# Patient Record
Sex: MALE | Race: BLACK OR AFRICAN AMERICAN | URBAN - METROPOLITAN AREA
[De-identification: names, ages, dates, MRNs, and addresses within clinical notes are randomized per-mention and may not be internally consistent; named-entity substitution may affect disease eponyms.]

---

## 2018-03-30 ENCOUNTER — OFFICE VISIT (OUTPATIENT)
Dept: URGENT CARE | Facility: URGENT CARE | Age: 53
End: 2018-03-30
Payer: COMMERCIAL

## 2018-03-30 VITALS
RESPIRATION RATE: 16 BRPM | SYSTOLIC BLOOD PRESSURE: 178 MMHG | WEIGHT: 196 LBS | HEART RATE: 79 BPM | OXYGEN SATURATION: 99 % | DIASTOLIC BLOOD PRESSURE: 100 MMHG | TEMPERATURE: 97.8 F

## 2018-03-30 DIAGNOSIS — R07.0 THROAT PAIN: Primary | ICD-10-CM

## 2018-03-30 LAB
BASOPHILS # BLD AUTO: 0.1 10E9/L (ref 0–0.2)
BASOPHILS NFR BLD AUTO: 0.7 %
DEPRECATED S PYO AG THROAT QL EIA: NORMAL
DIFFERENTIAL METHOD BLD: NORMAL
EOSINOPHIL # BLD AUTO: 0 10E9/L (ref 0–0.7)
EOSINOPHIL NFR BLD AUTO: 0.4 %
ERYTHROCYTE [DISTWIDTH] IN BLOOD BY AUTOMATED COUNT: 13.3 % (ref 10–15)
HCT VFR BLD AUTO: 50.7 % (ref 40–53)
HGB BLD-MCNC: 16.9 G/DL (ref 13.3–17.7)
LYMPHOCYTES # BLD AUTO: 2.9 10E9/L (ref 0.8–5.3)
LYMPHOCYTES NFR BLD AUTO: 42.8 %
MCH RBC QN AUTO: 30.6 PG (ref 26.5–33)
MCHC RBC AUTO-ENTMCNC: 33.3 G/DL (ref 31.5–36.5)
MCV RBC AUTO: 92 FL (ref 78–100)
MONOCYTES # BLD AUTO: 0.6 10E9/L (ref 0–1.3)
MONOCYTES NFR BLD AUTO: 8.4 %
NEUTROPHILS # BLD AUTO: 3.2 10E9/L (ref 1.6–8.3)
NEUTROPHILS NFR BLD AUTO: 47.7 %
PLATELET # BLD AUTO: 227 10E9/L (ref 150–450)
RBC # BLD AUTO: 5.52 10E12/L (ref 4.4–5.9)
SPECIMEN SOURCE: NORMAL
TSH SERPL DL<=0.005 MIU/L-ACNC: 3.74 MU/L (ref 0.4–4)
WBC # BLD AUTO: 6.7 10E9/L (ref 4–11)

## 2018-03-30 PROCEDURE — 85025 COMPLETE CBC W/AUTO DIFF WBC: CPT | Performed by: FAMILY MEDICINE

## 2018-03-30 PROCEDURE — 87880 STREP A ASSAY W/OPTIC: CPT | Performed by: FAMILY MEDICINE

## 2018-03-30 PROCEDURE — 99203 OFFICE O/P NEW LOW 30 MIN: CPT | Performed by: FAMILY MEDICINE

## 2018-03-30 PROCEDURE — 36415 COLL VENOUS BLD VENIPUNCTURE: CPT | Performed by: FAMILY MEDICINE

## 2018-03-30 PROCEDURE — 87081 CULTURE SCREEN ONLY: CPT | Performed by: FAMILY MEDICINE

## 2018-03-30 PROCEDURE — 84443 ASSAY THYROID STIM HORMONE: CPT | Performed by: FAMILY MEDICINE

## 2018-03-30 RX ORDER — IBUPROFEN 600 MG/1
600 TABLET, FILM COATED ORAL EVERY 8 HOURS PRN
Qty: 60 TABLET | Refills: 0 | Status: SHIPPED | OUTPATIENT
Start: 2018-03-30

## 2018-03-30 NOTE — LETTER
[facility]  [address] [address]  Phone: [phone]    March 30, 2018        Adriel Mack  [address]  [facility] [address]          To whom it may concern:    RE: Adriel Mack    Patient was seen and treated today at our clinic. He may return to work on 4/2/18    Please contact me for questions or concerns.      Sincerely,        Aubrey Gross MD

## 2018-03-30 NOTE — MR AVS SNAPSHOT
"              After Visit Summary   3/30/2018    Adriel Mack    MRN: [id number]           Patient Information     Date Of Birth          1965        Visit Information        Provider Department      3/30/2018 7:05 PM Aubrey Gross MD [facility]        Today's Diagnoses     Throat pain    -  1       Follow-ups after your visit        Who to contact     If you have questions or need follow up information about today's clinic visit or your schedule please contact [facility] directly at [phone].  Normal or non-critical lab and imaging results will be communicated to you by In[employer], letter or phone within 4 business days after the clinic has received the results. If you do not hear from us within 7 days, please contact the clinic through Inflow[employer] or phone. If you have a critical or abnormal lab result, we will notify you by phone as soon as possible.  Submit refill requests through [employer] or call your pharmacy and they will forward the refill request to us. Please allow 3 business days for your refill to be completed.          Additional Information About Your Visit        MyChart Information     [employer] lets you send messages to your doctor, view your test results, renew your prescriptions, schedule appointments and more. To sign up, go to [email or web address] . Click on \"Log in\" on the left side of the screen, which will take you to the Welcome page. Then click on \"Sign up Now\" on the right side of the page.     You will be asked to enter the access code listed below, as well as some personal information. Please follow the directions to create your username and password.     Your access code is: [email or web address]  Expires: 2018  8:27 PM     Your access code will  in 90 days. If you need help or a new code, please call your [place] clinic or [phone].        Care EveryWhere ID     This is your Care EveryWhere ID. This could be used " by other organizations to access your [place] medical records  [id number]        Your Vitals Were     Pulse Temperature Respirations Pulse Oximetry          79 97.8  F (36.6  C) (Tympanic) 16 99%         Blood Pressure from Last 3 Encounters:   03/30/18 (!) 178/100    Weight from Last 3 Encounters:   03/30/18 196 lb (88.9 kg)              We Performed the Following     Beta strep group A culture     CBC with platelets and differential     Strep, Rapid Screen     TSH          Today's Medication Changes          These changes are accurate as of 3/30/18  8:27 PM.  If you have any questions, ask your nurse or doctor.               Start taking these medicines.        Dose/Directions    ibuprofen 600 MG tablet   Commonly known as:  ADVIL/MOTRIN   Used for:  Throat pain   Started by:  Aubrey Gross MD        Dose:  600 mg   Take 1 tablet (600 mg) by mouth every 8 hours as needed for moderate pain   Quantity:  60 tablet   Refills:  0            Where to get your medicines      These medications were sent to [facility] [address] [place], [place] [address] [address] [address]     Phone:  [phone]     ibuprofen 600 MG tablet                Primary Care Provider Fax #    Physician No Ref-Primary [phone]       No address on file        Equal Access to Services     MARI [facility]: Charlene girono Somayraali, waaxda luqadaha, qaybta kaalmada adeegyada, holly lewis. So [facility] [phone].    ATENCIÓN: Si habla español, tiene a de souza disposición servicios gratuitos de asistencia lingüística. Justin al [phone].    We comply with applicable federal civil rights laws and [place] laws. We do not discriminate on the basis of race, color, national origin, age, disability, sex, sexual orientation, or gender identity.            Thank you!     Thank you for choosing [facility]  for your care. Our goal is always to  provide you with excellent care. Hearing back from our patients is one way we can continue to improve our services. Please take a few minutes to complete the written survey that you may receive in the mail after your visit with us. Thank you!             Your Updated Medication List - Protect others around you: Learn how to safely use, store and throw away your medicines at www.disposemymeds.org.          This list is accurate as of 3/30/18  8:27 PM.  Always use your most recent med list.                   Brand Name Dispense Instructions for use Diagnosis    ibuprofen 600 MG tablet    ADVIL/MOTRIN    60 tablet    Take 1 tablet (600 mg) by mouth every 8 hours as needed for moderate pain    Throat pain

## 2018-03-31 LAB
BACTERIA SPEC CULT: NORMAL
SPECIMEN SOURCE: NORMAL

## 2018-03-31 NOTE — NURSING NOTE
Chief Complaint   Patient presents with     Urgent Care     Pt c/o vomiting, sore throat and fever for 2 days       Initial BP (!) 178/100  Pulse 79  Temp 97.8  F (36.6  C) (Tympanic)  Resp 16  Wt 196 lb (88.9 kg)  SpO2 99% There is no height or weight on file to calculate BMI.  Medication Reconciliation: unable or not appropriate to perform    Arslan Villarreal, CMA

## 2018-10-20 ENCOUNTER — OFFICE VISIT (OUTPATIENT)
Dept: URGENT CARE | Facility: URGENT CARE | Age: 53
End: 2018-10-20
Payer: COMMERCIAL

## 2018-10-20 VITALS
HEART RATE: 88 BPM | DIASTOLIC BLOOD PRESSURE: 90 MMHG | SYSTOLIC BLOOD PRESSURE: 166 MMHG | OXYGEN SATURATION: 97 % | RESPIRATION RATE: 20 BRPM | TEMPERATURE: 98.2 F | WEIGHT: 193.31 LBS

## 2018-10-20 DIAGNOSIS — R53.83 OTHER FATIGUE: Primary | ICD-10-CM

## 2018-10-20 DIAGNOSIS — G47.00 INSOMNIA, UNSPECIFIED TYPE: ICD-10-CM

## 2018-10-20 PROCEDURE — 99214 OFFICE O/P EST MOD 30 MIN: CPT | Performed by: FAMILY MEDICINE

## 2018-10-20 RX ORDER — TRAZODONE HYDROCHLORIDE 50 MG/1
50 TABLET, FILM COATED ORAL
Qty: 30 TABLET | Refills: 1 | Status: SHIPPED | OUTPATIENT
Start: 2018-10-20

## 2018-10-20 NOTE — LETTER
[facility]  [address] [address]  [phone]      October 20, 2018    RE:  Adriel Mack                                                                                                                                                       [address]  [facility] [address]            To whom it may concern:    Adriel Mack is under my professional care for an acute illness.   He is excused from 10/18/2018 until 10/25/2018.      Sincerely,        Sukhi Redd MD    [facility]

## 2018-10-20 NOTE — PROGRESS NOTES
Depression / Anxiety    Onset: 2 week(s) ago    Description:         Depression: YES        Anxiety: YES  Any recent familial/socialchanges: difficulties between family members and recent move  Still participating in activities that you used to enjoy: YES  Problems with sleep: YES  Any thoughts of hurting yourself or others: none  Able to fulfill responsibilities: yes    Accompanying Signs & Symptoms:   Fatigue: YES  Irritability: YES  Difficulty concentrating: YES  Changes in appetite: YES  Heart racing/beating fast (tachycardia): no  Shortness of breath: no  Numbness: no    Precipitating and/or Alleviating factors:    Worse when going to school or work: no  Able to leave home: yes  Able to go in crowds: yes  Alcohol/drug use: using more etoh than he'd like    History:                   Family history of depression: no                 Family history of Anxiety: no  History of hospitalization for depression: no    Therapies tried, side effects and outcome: None      Non smoker    No prior surgery    ROS:  Review Of Systems  Skin: negative  Eyes: negative  Ears/Nose/Throat: negative  Respiratory: No shortness of breath, dyspnea on exertion, cough, or hemoptysis  Cardiovascular: negative  Gastrointestinal: positive for poor appetite  Genitourinary: negative  Musculoskeletal: negative  Neurologic: negative  Psychiatric: negative for, thoughts of self-harm and thoughts of hurting someone else  Hematologic/Lymphatic/Immunologic: negative  Endocrine: negative     /90 (Cuff Size: Adult Regular)  Pulse 88  Temp 98.2  F (36.8  C) (Oral)  Resp 20  Wt 193 lb 5 oz (87.7 kg)  SpO2 97%    GENERAL: healthy, alert and mild distress  EYES: Eyes grossly normal to inspection, PERRL and conjunctivae and sclerae normal  HENT: ear canals and TM's normal, nose and mouth without ulcers or lesions  NECK: no adenopathy, no asymmetry, masses, or scars and thyroid normal to palpation  RESP: lungs clear to auscultation - no rales,  rhonchi or wheezes  CV: regular rate and rhythm, normal S1 S2, no S3 or S4, no murmur, click or rub, no peripheral edema and peripheral pulses strong  MS: no gross musculoskeletal defects noted, no edema  SKIN: no suspicious lesions or rashes  NEURO: Normal strength and tone, mentation intact and speech normal  PSYCH: mentation appears normal, tearful, frustrated    ASSESSMENT:  1. Other fatigue  Reassure   Must stop drinking   Offered CD   Declined   See pcp next week here    2. Insomnia, unspecified type  Trial of sleep med    Hygiene discussed    To ED immed with increased SI, HI, frustration, etc.     - traZODone (DESYREL) 50 MG tablet; Take 1 tablet (50 mg) by mouth nightly as needed for sleep  Dispense: 30 tablet; Refill: 1

## 2018-10-20 NOTE — MR AVS SNAPSHOT
After Visit Summary   10/20/2018    Adriel Mack    MRN: [id number]           Patient Information     Date Of Birth          1965        Visit Information        Provider Department      10/20/2018 10:50 AM Sukhi Redd MD [facility]        Today's Diagnoses     Other fatigue    -  1    Insomnia, unspecified type           Follow-ups after your visit        Follow-up notes from your care team     Return in about 1 week (around 10/27/2018).      Who to contact     If you have questions or need follow up information about today's clinic visit or your schedule please contact [facility] directly at [phone].  Normal or non-critical lab and imaging results will be communicated to you by MyChart, letter or phone within 4 business days after the clinic has received the results. If you do not hear from us within 7 days, please contact the clinic through [employer] or phone. If you have a critical or abnormal lab result, we will notify you by phone as soon as possible.  Submit refill requests through HAM-IT or call your pharmacy and they will forward the refill request to us. Please allow 3 business days for your refill to be completed.          Additional Information About Your Visit        MyChart Information     HAM-IT gives you secure access to your electronic health record. If you see a primary care provider, you can also send messages to your care team and make appointments. If you have questions, please call your primary care clinic.  If you do not have a primary care provider, please call [phone] and they will assist you.        Care EveryWhere ID     This is your Care EveryWhere ID. This could be used by other organizations to access your [facility] medical records  [id number]        Your Vitals Were     Pulse Temperature Respirations Pulse Oximetry          88 98.2  F (36.8  C) (Oral) 20 97%         Blood Pressure from Last  3 Encounters:   10/20/18 166/90   03/30/18 (!) 178/100    Weight from Last 3 Encounters:   10/20/18 193 lb 5 oz (87.7 kg)   03/30/18 196 lb (88.9 kg)              Today, you had the following     No orders found for display         Today's Medication Changes          These changes are accurate as of 10/20/18  2:04 PM.  If you have any questions, ask your nurse or doctor.               Start taking these medicines.        Dose/Directions    traZODone 50 MG tablet   Commonly known as:  DESYREL   Used for:  Insomnia, unspecified type   Started by:  Sukhi Redd MD        Dose:  50 mg   Take 1 tablet (50 mg) by mouth nightly as needed for sleep   Quantity:  30 tablet   Refills:  1            Where to get your medicines      These medications were sent to [facility] [address] [address]     Phone:  [phone]     traZODone 50 MG tablet                Primary Care Provider Fax #    Physician No Ref-Primary [phone]       No address on file        Equal Access to Services     [facility]: Hadii manish farmer hadasho Soomaali, waaxda luqadaha, qaybta kaalmada adetammie, holly toro . So [facility] [phone].    ATENCIÓN: Si habla español, tiene a de souza disposición servicios gratuitos de asistencia lingüística. Justin al [phone].    We comply with applicable federal civil rights laws and [place] laws. We do not discriminate on the basis of race, color, national origin, age, disability, sex, sexual orientation, or gender identity.            Thank you!     Thank you for choosing [facility]  for your care. Our goal is always to provide you with excellent care. Hearing back from our patients is one way we can continue to improve our services. Please take a few minutes to complete the written survey that you may receive in the mail after your visit with us. Thank you!             Your Updated  Medication List - Protect others around you: Learn how to safely use, store and throw away your medicines at www.disposemymeds.org.          This list is accurate as of 10/20/18  2:04 PM.  Always use your most recent med list.                   Brand Name Dispense Instructions for use Diagnosis    ibuprofen 600 MG tablet    ADVIL/MOTRIN    60 tablet    Take 1 tablet (600 mg) by mouth every 8 hours as needed for moderate pain    Throat pain       traZODone 50 MG tablet    DESYREL    30 tablet    Take 1 tablet (50 mg) by mouth nightly as needed for sleep    Insomnia, unspecified type

## 2018-11-07 ENCOUNTER — TELEPHONE (OUTPATIENT)
Dept: FAMILY MEDICINE | Facility: CLINIC | Age: 53
End: 2018-11-07

## 2018-11-07 NOTE — TELEPHONE ENCOUNTER
Reason for call:  Other   Patient called regarding (reason for call): call back  Additional comments: Patients needs records from visit 10/20/2018 transferred to his insurance. Please contact Иван representative Anthony Bolton at [phone]    Phone number to reach patient:  Home number on file [phone] (home)    Best Time:  anytime    Can we leave a detailed message on this number?  NO

## 2018-11-19 ENCOUNTER — TELEPHONE (OUTPATIENT)
Dept: URGENT CARE | Facility: URGENT CARE | Age: 53
End: 2018-11-19

## 2018-11-19 NOTE — TELEPHONE ENCOUNTER
Pt walked in to clinic to get some insurance forms filled by Dr. Redd. Pt was notied that at this time Dr. Redd is not available but that he would be at the [facility] from 5-9 today 11/19/2018. I did offer to fax over his [employer] papers to [employer] this afternoon and get them faxed back. Pt would need the paper filled  before 11/22/2018. Pt also stated he was very unsatisfied with the whole process of getting the MR, and getting the doctor to fill out his paper work. Sara Jefferson MA

## 2024-10-12 NOTE — PROGRESS NOTES
SUBJECTIVE:   Adriel Mack is a 52 year old male presenting with a chief complaint of fever and sweats.  Onset of symptoms was 3 day(s) ago.  Course of illness is same.    Severity moderate  Current and Associated symptoms: fever, sweats, sore throat, fatigue and decreased appetite   Treatment measures tried include None tried.  Predisposing factors include ill contact: Work.    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 Has not been seen by a health care provider in several years. Not on any daily medications.     No past medical history on file.  No current outpatient prescriptions on file.     Social History   Substance Use Topics     Smoking status: Never Smoker     Smokeless tobacco: Never Used     Alcohol use Not on file     ROS:  CONSTITUTIONAL:anorexia, fever and sweats  EYES: NEGATIVE for vision changes or irritation and redness bilateral  ENT/MOUTH: NEGATIVE for ear, mouth and throat problems  RESP:NEGATIVE for significant cough or SOB  CV: NEGATIVE for chest pain, palpitations or peripheral edema  GI: NEGATIVE for nausea, abdominal pain, heartburn, or change in bowel habits  MUSCULOSKELETAL: NEGATIVE for significant arthralgias or myalgia  NEURO: NEGATIVE for weakness, dizziness or paresthesias  ENDOCRINE: NEGATIVE for temperature intolerance, skin/hair changes  HEME/ALLERGY/IMMUNE: NEGATIVE for bleeding problems  PSYCHIATRIC: NEGATIVE for changes in mood or affect    OBJECTIVE:  BP (!) 178/100  Pulse 79  Temp 97.8  F (36.6  C) (Tympanic)  Resp 16  Wt 196 lb (88.9 kg)  SpO2 99%  GENERAL APPEARANCE: healthy, alert and no distress  EYES: Eyes grossly normal to inspection, fundi benign-no diabetic or hypertensive changes seen, PERRL and conjunctiva/corneas- conjunctival injection bilaterally   HENT: ear canals and TM's normal.   Nose and mouth without ulcers, erythema or lesions  NECK: supple, nontender, no lymphadenopathy, thyroid mildly enlarged no nodules, non tender  RESP: lungs clear to auscultation - no rales, rhonchi or wheezes  CV: regular rates and rhythm, normal S1 S2, no murmur noted  ABDOMEN:  soft, nontender, no HSM or masses and bowel sounds normal  NEURO: Normal strength and tone, sensory exam grossly normal,  normal speech and mentation  SKIN: no suspicious lesions or rashes    ASSESSMENT:  Ddx: Viral pharyngitis, Viral syndrome and Viral upper respiratory illness    51 y/o M seen today for fevers. Exam as noted above, currently HDS, afebrile and in no acute distress. Likely viral syndrome as cause of symptoms as strep test negative and CBC unremarkable. BP elevated, but pt also very anxious and does not see health care provider regular, so expect some degree of false elevation, advised to follow up with PCP to reassess. Thyroid with subtle enlargement on exam, but no nodules or pain with palpation, TSH pending.     PLAN:  Ibuprofen, Fluids and Rest  See orders in Epic    Aubrey Gross MD   [facility]        GENERAL: NAD, lying in bed comfortably  HEAD:  Atraumatic, normocephalic  EYES: EOMI, PERRLA, conjunctiva and sclera clear  NECK: Supple, trachea midline, no JVD  HEART: Regular rate and rhythm, no murmurs, rubs, or gallops  LUNGS: Unlabored respirations.  Clear to auscultation bilaterally, no crackles, wheezing, or rhonchi  ABDOMEN: Soft, nontender, nondistended, +BS  EXTREMITIES: 2+ peripheral pulses bilaterally. No clubbing, cyanosis, or edema  NERVOUS SYSTEM:  A&Ox3, moving all extremities, no focal deficits   SKIN: No rashes or lesions GENERAL: Fatigued, NAD, lying in bed comfortably  HEAD:  Atraumatic, normocephalic  EYES: EOMI, PERRLA, conjunctiva and sclera clear  NECK:  no JVD  HEART: Regular rate and rhythm, no murmurs  LUNGS: Unlabored respirations.  Clear to auscultation bilaterally, no crackles, wheezing, or rhonchi  ABDOMEN: Soft, nontender, nondistended, +BS, well healed surgical scars   EXTREMITIES: WWP, intact peripheral pulses, no edema b/l   NERVOUS SYSTEM:  A&Ox3, moving all extremities, no focal deficits   SKIN: Gluteal abscess packing in place, with seton in place.